# Patient Record
Sex: FEMALE | Race: ASIAN | NOT HISPANIC OR LATINO | URBAN - METROPOLITAN AREA
[De-identification: names, ages, dates, MRNs, and addresses within clinical notes are randomized per-mention and may not be internally consistent; named-entity substitution may affect disease eponyms.]

---

## 2017-11-01 ENCOUNTER — EMERGENCY (EMERGENCY)
Facility: HOSPITAL | Age: 37
LOS: 1 days | Discharge: HOME | End: 2017-11-01
Attending: EMERGENCY MEDICINE | Admitting: EMERGENCY MEDICINE
Payer: COMMERCIAL

## 2017-11-01 VITALS
OXYGEN SATURATION: 99 % | HEART RATE: 108 BPM | RESPIRATION RATE: 15 BRPM | TEMPERATURE: 98 F | SYSTOLIC BLOOD PRESSURE: 142 MMHG | DIASTOLIC BLOOD PRESSURE: 92 MMHG

## 2017-11-01 PROCEDURE — 99284 EMERGENCY DEPT VISIT MOD MDM: CPT | Mod: 25

## 2017-11-01 PROCEDURE — 99284 EMERGENCY DEPT VISIT MOD MDM: CPT | Mod: GC

## 2017-11-01 RX ORDER — DEXAMETHASONE 0.5 MG/5ML
10 ELIXIR ORAL ONCE
Qty: 0 | Refills: 0 | Status: COMPLETED | OUTPATIENT
Start: 2017-11-01 | End: 2017-11-01

## 2017-11-01 RX ORDER — DIPHENHYDRAMINE HCL 50 MG
25 CAPSULE ORAL ONCE
Qty: 0 | Refills: 0 | Status: COMPLETED | OUTPATIENT
Start: 2017-11-01 | End: 2017-11-01

## 2017-11-01 RX ORDER — FAMOTIDINE 10 MG/ML
20 INJECTION INTRAVENOUS ONCE
Qty: 0 | Refills: 0 | Status: COMPLETED | OUTPATIENT
Start: 2017-11-01 | End: 2017-11-01

## 2017-11-01 RX ORDER — SODIUM CHLORIDE 9 MG/ML
1000 INJECTION INTRAMUSCULAR; INTRAVENOUS; SUBCUTANEOUS ONCE
Qty: 0 | Refills: 0 | Status: COMPLETED | OUTPATIENT
Start: 2017-11-01 | End: 2017-11-01

## 2017-11-01 RX ORDER — KETOROLAC TROMETHAMINE 30 MG/ML
30 SYRINGE (ML) INJECTION ONCE
Qty: 0 | Refills: 0 | Status: DISCONTINUED | OUTPATIENT
Start: 2017-11-01 | End: 2017-11-01

## 2017-11-01 NOTE — ED PROVIDER NOTE - NS_ ATTENDINGSCRIBEDETAILS _ED_A_ED_FT
Dr. Schultz:  I personally performed the services described in the documentation, reviewed the documentation recorded by the scribe in my presence and it accurately and completely records my words and action. The scribe's documentation has been prepared under my direction and personally reviewed by me in its entirety. I confirm that the note above accurately reflects all work, treatment, procedures, and medical decision making performed by me.

## 2017-11-01 NOTE — ED ADULT TRIAGE NOTE - CHIEF COMPLAINT QUOTE
Pt c/o generalized rash for the past two weeks.  Pt states was evaluated at start of rash at [facility], had blood work done and told everything was negative.  Pt states was told it may be bacterial.  Pt denies any itchiness/pain.  Pt just endorses swelling to legs and hands that are causing discomfort.  denies any PMHx.

## 2017-11-01 NOTE — ED PROVIDER NOTE - CONSTITUTIONAL, MLM
normal... Well appearing, well nourished, awake, alert, oriented to person, place, time/situation and in no apparent distress. nontoxic

## 2017-11-01 NOTE — ED PROVIDER NOTE - OBJECTIVE STATEMENT
38 y/o F pt with no sig PMHx, arrives to the ED c/o a worsening diffused rash and diffused swelling onset x2 weeks. Pt reports that she was at another hospital and was d/c home with no meds. because her blood tests came back nml. Pt has not been on steroids. Has not taken Benadryl. LNMP: two weeks ago. Also c/o body aches and difficulty bearing weight. Denies fever, urinary issues, night sweats, cough or any other complaints. NKDA.

## 2017-11-02 VITALS
RESPIRATION RATE: 18 BRPM | OXYGEN SATURATION: 100 % | SYSTOLIC BLOOD PRESSURE: 127 MMHG | DIASTOLIC BLOOD PRESSURE: 74 MMHG | HEART RATE: 104 BPM | TEMPERATURE: 98 F

## 2017-11-02 LAB
ALBUMIN SERPL ELPH-MCNC: 4.4 G/DL — SIGNIFICANT CHANGE UP (ref 3.3–5)
ALP SERPL-CCNC: 95 U/L — SIGNIFICANT CHANGE UP (ref 40–120)
ALT FLD-CCNC: 29 U/L — SIGNIFICANT CHANGE UP (ref 4–33)
APPEARANCE UR: CLEAR — SIGNIFICANT CHANGE UP
AST SERPL-CCNC: 30 U/L — SIGNIFICANT CHANGE UP (ref 4–32)
BASOPHILS # BLD AUTO: 0.02 K/UL — SIGNIFICANT CHANGE UP (ref 0–0.2)
BASOPHILS NFR BLD AUTO: 0.2 % — SIGNIFICANT CHANGE UP (ref 0–2)
BILIRUB SERPL-MCNC: 0.3 MG/DL — SIGNIFICANT CHANGE UP (ref 0.2–1.2)
BILIRUB UR-MCNC: NEGATIVE — SIGNIFICANT CHANGE UP
BLOOD UR QL VISUAL: HIGH
BUN SERPL-MCNC: 14 MG/DL — SIGNIFICANT CHANGE UP (ref 7–23)
CALCIUM SERPL-MCNC: 9.4 MG/DL — SIGNIFICANT CHANGE UP (ref 8.4–10.5)
CHLORIDE SERPL-SCNC: 102 MMOL/L — SIGNIFICANT CHANGE UP (ref 98–107)
CO2 SERPL-SCNC: 24 MMOL/L — SIGNIFICANT CHANGE UP (ref 22–31)
COLOR SPEC: SIGNIFICANT CHANGE UP
CREAT SERPL-MCNC: 0.75 MG/DL — SIGNIFICANT CHANGE UP (ref 0.5–1.3)
CRP SERPL-MCNC: 34.2 MG/L — HIGH
EOSINOPHIL # BLD AUTO: 0.12 K/UL — SIGNIFICANT CHANGE UP (ref 0–0.5)
EOSINOPHIL NFR BLD AUTO: 1.1 % — SIGNIFICANT CHANGE UP (ref 0–6)
ERYTHROCYTE [SEDIMENTATION RATE] IN BLOOD: 23 MM/HR — SIGNIFICANT CHANGE UP (ref 4–25)
GLUCOSE SERPL-MCNC: 115 MG/DL — HIGH (ref 70–99)
GLUCOSE UR-MCNC: NEGATIVE — SIGNIFICANT CHANGE UP
HBA1C BLD-MCNC: 6 % — HIGH (ref 4–5.6)
HCT VFR BLD CALC: 40.5 % — SIGNIFICANT CHANGE UP (ref 34.5–45)
HGB BLD-MCNC: 13.2 G/DL — SIGNIFICANT CHANGE UP (ref 11.5–15.5)
IMM GRANULOCYTES # BLD AUTO: 0.02 # — SIGNIFICANT CHANGE UP
IMM GRANULOCYTES NFR BLD AUTO: 0.2 % — SIGNIFICANT CHANGE UP (ref 0–1.5)
KETONES UR-MCNC: NEGATIVE — SIGNIFICANT CHANGE UP
LEUKOCYTE ESTERASE UR-ACNC: NEGATIVE — SIGNIFICANT CHANGE UP
LYMPHOCYTES # BLD AUTO: 2.24 K/UL — SIGNIFICANT CHANGE UP (ref 1–3.3)
LYMPHOCYTES # BLD AUTO: 21 % — SIGNIFICANT CHANGE UP (ref 13–44)
MCHC RBC-ENTMCNC: 27.6 PG — SIGNIFICANT CHANGE UP (ref 27–34)
MCHC RBC-ENTMCNC: 32.6 % — SIGNIFICANT CHANGE UP (ref 32–36)
MCV RBC AUTO: 84.7 FL — SIGNIFICANT CHANGE UP (ref 80–100)
MONOCYTES # BLD AUTO: 0.6 K/UL — SIGNIFICANT CHANGE UP (ref 0–0.9)
MONOCYTES NFR BLD AUTO: 5.6 % — SIGNIFICANT CHANGE UP (ref 2–14)
NEUTROPHILS # BLD AUTO: 7.66 K/UL — HIGH (ref 1.8–7.4)
NEUTROPHILS NFR BLD AUTO: 71.9 % — SIGNIFICANT CHANGE UP (ref 43–77)
NITRITE UR-MCNC: NEGATIVE — SIGNIFICANT CHANGE UP
NRBC # FLD: 0 — SIGNIFICANT CHANGE UP
PH UR: 6 — SIGNIFICANT CHANGE UP (ref 4.6–8)
PLATELET # BLD AUTO: 258 K/UL — SIGNIFICANT CHANGE UP (ref 150–400)
PMV BLD: 9.5 FL — SIGNIFICANT CHANGE UP (ref 7–13)
POTASSIUM SERPL-MCNC: 3.5 MMOL/L — SIGNIFICANT CHANGE UP (ref 3.5–5.3)
POTASSIUM SERPL-SCNC: 3.5 MMOL/L — SIGNIFICANT CHANGE UP (ref 3.5–5.3)
PROT SERPL-MCNC: 8.1 G/DL — SIGNIFICANT CHANGE UP (ref 6–8.3)
PROT UR-MCNC: NEGATIVE — SIGNIFICANT CHANGE UP
RBC # BLD: 4.78 M/UL — SIGNIFICANT CHANGE UP (ref 3.8–5.2)
RBC # FLD: 12.9 % — SIGNIFICANT CHANGE UP (ref 10.3–14.5)
RBC CASTS # UR COMP ASSIST: SIGNIFICANT CHANGE UP (ref 0–?)
SODIUM SERPL-SCNC: 141 MMOL/L — SIGNIFICANT CHANGE UP (ref 135–145)
SP GR SPEC: 1.01 — SIGNIFICANT CHANGE UP (ref 1–1.03)
SQUAMOUS # UR AUTO: SIGNIFICANT CHANGE UP
UROBILINOGEN FLD QL: NORMAL E.U. — SIGNIFICANT CHANGE UP (ref 0.1–0.2)
WBC # BLD: 10.66 K/UL — HIGH (ref 3.8–10.5)
WBC # FLD AUTO: 10.66 K/UL — HIGH (ref 3.8–10.5)
WBC UR QL: SIGNIFICANT CHANGE UP (ref 0–?)

## 2017-11-02 PROCEDURE — 99235 HOSP IP/OBS SAME DATE MOD 70: CPT

## 2017-11-02 RX ORDER — SODIUM CHLORIDE 9 MG/ML
1000 INJECTION INTRAMUSCULAR; INTRAVENOUS; SUBCUTANEOUS
Qty: 0 | Refills: 0 | Status: DISCONTINUED | OUTPATIENT
Start: 2017-11-02 | End: 2017-11-11

## 2017-11-02 RX ORDER — DIPHENHYDRAMINE HCL 50 MG
25 CAPSULE ORAL EVERY 6 HOURS
Qty: 0 | Refills: 0 | Status: DISCONTINUED | OUTPATIENT
Start: 2017-11-02 | End: 2017-11-11

## 2017-11-02 RX ORDER — COLCHICINE 0.6 MG
1 TABLET ORAL
Qty: 27 | Refills: 0 | OUTPATIENT
Start: 2017-11-02 | End: 2017-11-16

## 2017-11-02 RX ORDER — FAMOTIDINE 10 MG/ML
20 INJECTION INTRAVENOUS EVERY 12 HOURS
Qty: 0 | Refills: 0 | Status: DISCONTINUED | OUTPATIENT
Start: 2017-11-02 | End: 2017-11-11

## 2017-11-02 RX ORDER — COLCHICINE 0.6 MG
0.6 TABLET ORAL ONCE
Qty: 0 | Refills: 0 | Status: COMPLETED | OUTPATIENT
Start: 2017-11-02 | End: 2017-11-02

## 2017-11-02 RX ADMIN — FAMOTIDINE 20 MILLIGRAM(S): 10 INJECTION INTRAVENOUS at 00:32

## 2017-11-02 RX ADMIN — Medication 30 MILLIGRAM(S): at 00:30

## 2017-11-02 RX ADMIN — Medication 25 MILLIGRAM(S): at 06:04

## 2017-11-02 RX ADMIN — Medication 30 MILLIGRAM(S): at 00:45

## 2017-11-02 RX ADMIN — Medication 25 MILLIGRAM(S): at 12:40

## 2017-11-02 RX ADMIN — Medication 0.6 MILLIGRAM(S): at 13:42

## 2017-11-02 RX ADMIN — SODIUM CHLORIDE 2000 MILLILITER(S): 9 INJECTION INTRAMUSCULAR; INTRAVENOUS; SUBCUTANEOUS at 00:13

## 2017-11-02 RX ADMIN — Medication 25 MILLIGRAM(S): at 00:35

## 2017-11-02 RX ADMIN — FAMOTIDINE 20 MILLIGRAM(S): 10 INJECTION INTRAVENOUS at 12:40

## 2017-11-02 RX ADMIN — Medication 102 MILLIGRAM(S): at 00:40

## 2017-11-02 RX ADMIN — SODIUM CHLORIDE 150 MILLILITER(S): 9 INJECTION INTRAMUSCULAR; INTRAVENOUS; SUBCUTANEOUS at 03:45

## 2017-11-02 NOTE — ED CDU PROVIDER INITIAL DAY NOTE - MUSCULOSKELETAL [+], MLM
body aches and difficulty bearing weight due to lower extremity swelling associated with rash and discomfort due to this.

## 2017-11-02 NOTE — ED ADULT NURSE NOTE - CHIEF COMPLAINT QUOTE
Pt c/o generalized rash for the past two weeks.  Pt states was evaluated at start of rash at [facility], had blood work done and told everything was negative.  Pt states was told it may be bacterial.  Pt denies any itchiness/pain.  Pt just endorses swelling to legs and hands that are causing discomfort.  denies any PMHx.

## 2017-11-02 NOTE — ED CDU PROVIDER DISPOSITION NOTE - PLAN OF CARE
See your primary care doctor within 24-48 hours. Follow up at the rheumatology clinic on November 15th at 9am for further evaluation, [address] [place], [phone], bring copies of all reports with you. May also follow up with the dermatology clinic, call [phone] to make an appointment. START Colchicine 0.6mg twice a day with food for 2 weeks. Return to the ER for worsening symptoms or any other concerns.

## 2017-11-02 NOTE — ED CDU PROVIDER INITIAL DAY NOTE - MUSCULOSKELETAL, MLM
Range of motion is not limited, no muscle or joint tenderness objectively noted on exam.  No calf TTP bilaterally.

## 2017-11-02 NOTE — ED CDU PROVIDER INITIAL DAY NOTE - SKIN RASH DESCRIPTION
Discrete areas of macular nonblanchable erythema, most noted to LE>UE; rash is symmetrical, lesions are NTTP; lesions are discrete, but dermatitis is generalized to extremities.  Less numerous are similar appearing lesions to abdomen and torso.  No subungual lesions noted.  No mottling/cyanosis/pallor.

## 2017-11-02 NOTE — ED CDU PROVIDER INITIAL DAY NOTE - OBJECTIVE STATEMENT
38 y/o F pt with no sig PMHx, arrives to the ED c/o a worsening diffused rash and diffused swelling onset x2 weeks. Pt reports that she was at another hospital and was d/c home with no meds. because her blood tests came back nml. Pt has not been on steroids. Has not taken Benadryl. LNMP: two weeks ago. Also c/o body aches and difficulty bearing weight. Denies fever, urinary issues, night sweats, cough or any other complaints. NKDA.    CDU BLANE Chau Note-----  ED Provider Note reviewed per above; pt is a 38 yo female with no stated PMH, who presented to the ED for rash x 2 - 3 weeks as noted above.  Pt. was evaluated in the ED and diagnosed with rash that appears vasculitis in nature.  Pt was treated with Benadryl, Pepcid, Decadron, and Toradol (pt reports feeling improved) and sent to CDU for continuation of meds per orders, Derm and Rheum consult 11/2 daytime, observation and reassessment.  On CDU evaluation, pt has no active c/o; states feeling improved with medications.  Pt. denies pain at present; states was having discomfort to body (extremities) due to swelling from rash.  No hx/o fevers/chills; pt denies URI symptoms, chest discomfort, SOB, abdominal pain, N/V, recent illness, recent medication use.  Pt. is not on any home medications.  CDU plan discussed with pt who verbalizes agreement with plan.

## 2017-11-02 NOTE — ED CDU PROVIDER INITIAL DAY NOTE - PROGRESS NOTE DETAILS
CDU PA Isac Addendum----  Pt. resting comfortably in interim; no issues to date; will continue to monitor / reassess.  Pt. will be signed out to CDU day PA and attending at 0700 hrs. 36 y/o F no PMHx here c/o rash and subcutaneous edema, generalized, worse over the lower extremities x 2 weeks. In the main ED pt was given Decadron, Benadryl w/ some relief, however rash persists. Denies recent travel/viral illnesses/new meds. 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 Recommend follow up in 2 weeks. Pt feeling well otherwise.

## 2017-11-02 NOTE — ED CDU PROVIDER SUBSEQUENT DAY NOTE - HISTORY
38 y/o F no PMHx here c/o rash and subcutaneous edema, generalized, worse over the lower extremities x 2 weeks. In the main ED pt was given Decadron, Benadryl w/ some relief, however rash persists. Denies recent travel/viral illnesses/new meds. 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 Recommend follow up in 2 weeks. Pt feeling well otherwise.

## 2017-11-02 NOTE — ED CDU PROVIDER INITIAL DAY NOTE - ATTENDING CONTRIBUTION TO CARE
Case of a 38 y/o female patient with no past medical history of systemic illness that presented to the ED for rash. Patient wit macular/brusing rash tender with the appearance of vasculitis. Patient sent in to CDU for rheumatology and derm follow up. Patient not complaining of pain. Will monitor closely. Agree with resident's physical exam, assessment and documentation

## 2017-11-02 NOTE — ED CDU PROVIDER DISPOSITION NOTE - CLINICAL COURSE
Case of a 36 y/o female patient with no past medical history of systemic illness that presented to the ED for rash. Patient wit macular/brusing rash tender with the appearance of vasculitis. Patient sent in to CDU for rheumatology and derm follow up. Patient not complaining of pain. Patient evaluated by Rheumatology and they understand that she has a small vessel vasculitis. They recommended biopsy by dermatology outpatient and colchicine. We'll discharge patient on colchicine BID, f/u with derm outpatient and with rheumatology in two weeks.

## 2017-11-02 NOTE — ED ADULT NURSE NOTE - OBJECTIVE STATEMENT
pt states she has had a rash for 3 weeks that is not going away and getting worse. Red raised rash on extremities, lower abdomen and lower back. denies itching. c.o. increased swelling in legs with difficulty walking. respirations even and  unlabored.  states she was initially  seen at another hospital and told blood work was normal.  sl placed labs sent.

## 2017-11-02 NOTE — ED ADULT NURSE REASSESSMENT NOTE - NS ED NURSE REASSESS COMMENT FT1
No acute distress at present. Respirations are even and unlabored on room air. VS as noted. Pt. is resting comfortably. Family at bedside. Will continue to monitor.

## 2017-11-02 NOTE — CONSULT NOTE ADULT - ASSESSMENT
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  Pt with no other s/s to suggest ANCA associated vasculitis cryoglobulinemic vasculitis or hypersensitivity (urticarial) vasculitis  Generally, IgAV is self limited and does not require therapy; however with severe cutaneous involvement colchicine has been helpful  (https://www.ncbi.nlm.nih.gov/pmc/articles/AZI3767158/)      Plan:  Obtain skin bx - to look for leukocytoclastic vasculitis and IgA deposition  and immunofluorescent studies  check serum IgA level  Colchicine 0.6mg BID 36 yo F with new acute onset of diffuse purpuric rash, initially starting in LE and moving cephalad over the course of the last three weeks wo any inciting factors  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  Pt with no other s/s to suggest ANCA associated vasculitis cryoglobulinemic vasculitis or hypersensitivity (urticarial) vasculitis  Generally, IgAV is self limited and does not require therapy; however with severe cutaneous involvement colchicine has been helpful  (https://www.ncbi.nlm.nih.gov/pmc/articles/GGE5843075/)      Plan:  Obtain skin bx - to look for leukocytoclastic vasculitis (IgA deposition) and immunofluorescent studies  check serum IgA level  Colchicine 0.6mg BID  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  Pt with no other s/s to suggest ANCA associated vasculitis cryoglobulinemic vasculitis or hypersensitivity (urticarial) vasculitis  Generally, IgAV is self limited and does not require therapy; however with severe cutaneous involvement colchicine has been helpful  (https://www.ncbi.nlm.nih.gov/pmc/articles/ADU1192247/)      Plan:  Obtain skin bx - to look for leukocytoclastic vasculitis (IgA deposition) and immunofluorescent studies  check serum IgA level and complement level (c3, C4)  Colchicine 0.6mg BID  Pt [facility]   [phone]  11/16/17 9am 36 yo F with new acute onset of diffuse purpuric rash, initially starting in LE and moving cephalad over the course of the last three weeks wo any inciting factors   Clinically the appearance of rash is most consistent with small vessel vasculitis ,  limited to cutaneous involvement at this time = LCV.     Differential includes:  IgA vasculitis =HSP / vs  hypersensitivity vasculitis/    LCV associated with ANCA associated vasculitis / vs cryoglobulinemic vasculitis      HSP seem to be the most likely dx and although mostly seen in children  , it has been described in young adults . In adults it is more likely to have more sever course . It usually is self limited and does not require therapy, lasts about  over 4-6 weeks with occasionally with recurrence during that period   In severe cutaneous involvement - Colchicine has been helpful  (https://www.ncbi.nlm.nih.gov/pmc/articles/JER1748974/)      Plan:  Obtain skin bx  with  slides for IF stains to look for IgA deposition  check serum IgA level and complement level (c3, C4), cryo, ASO,   Colchicine 0.6mg BID  Pt to follow up with rheumatologist Dr. Silvestre  at [address] [facility], [place]   [phone]  11/16/17 9am

## 2017-11-02 NOTE — CONSULT NOTE ADULT - SUBJECTIVE AND OBJECTIVE BOX
TORI ARREDONDO  [id number]    HISTORY OF PRESENT ILLNESS:    36 yo F with no significant past medical history here with acute rash for which rheumatology is consulted for.  Pt states the rash suddenly began 3 weeks ago which were primarily in the legs initially.  Then after few days of stablility and prgression towards resolution, she dveloped a more diffuse rash which again started in legs and moved up to include her belly, buttocks and arms.   States the rash is red, slightly raised with some areas of tenderness and other areas not.  States it is not itchy  Pt denies any new medications, recent travels, sick exposure or herself being sick with any illiness including URI or UTI.  Denies any fever, HA, vision changes, sinus disease, chest pain, SOB, cough, hemoptysis abd pain, dysuria, hematuria, diarrhea or blood with BM.  Denies any history of joint pain, am stiffness or swelling - only yesterday had a period of knee pain which has since resolved  Denies any hx of asthma, allergies, kidney disease or pulmonary disease.    She is  with no complications in her pregnancy  No hx of any DVT/PE or coagulapathy    PAST MEDICAL & SURGICAL HISTORY:  No pertinent past medical history  No significant past surgical history      Review of Systems:  Gen:  No fevers/chills, weight loss  HEENT: No blurry vision, no difficulty swallowing  CVS: No chest pain/palpitations  Resp: No SOB/wheezing  GI: No N/V/C/D/abdominal pain  MSK:  Skin: No new rashes  Neuro: No headaches    MEDICATIONS  (STANDING):  colchicine 0.6 milliGRAM(s) Oral once  diphenhydrAMINE   Injectable 25 milliGRAM(s) IV Push every 6 hours  famotidine Injectable 20 milliGRAM(s) IV Push every 12 hours  sodium chloride 0.9%. 1000 milliLiter(s) (150 mL/Hr) IV Continuous <Continuous>    MEDICATIONS  (PRN):      Allergies    No Known Allergies    Intolerances        PERTINENT MEDICATION HISTORY:    SOCIAL HISTORY:  OCCUPATION:  TRAVEL HISTORY:    FAMILY HISTORY:  No pertinent family history in first degree relatives      Vital Signs Last 24 Hrs  T(C): 36.7 (2017 10:05), Max: 36.9 (2017 02:30)  T(F): 98.1 (2017 10:05), Max: 98.4 (2017 02:30)  HR: 104 (2017 10:05) (72 - 108)  BP: 127/74 (2017 10:05) (110/75 - 142/92)  BP(mean): --  RR: 18 (2017 10:05) (15 - 18)  SpO2: 100% (2017 10:05) (99% - 100%)    Daily     Daily     Physical Exam:  General: No apparent distress  HEENT: EOMI, MMM  CVS: +S1/S2, RRR, no murmurs/rubs/gallops  Resp: CTA b/l. No crackles/wheezing  GI: Soft, NT/ND +BS  MSK:  Shoulders: wnl  Elbows: wnl  Wrists: wnl  MCPs: wnl  PIPs: wnl  DIPs: wnl   Hips: wnl  Knees: wnl   Ankle: wnl  Neuro: AAOx3  Skin: no visible rashes    LABS:                        13.2   10.66 )-----------( 258      ( 2017 01:01 )             40.5     11-02    141  |  102  |  14  ----------------------------<  115<H>  3.5   |  24  |  0.75    Ca    9.4      2017 01:01    TPro  8.1  /  Alb  4.4  /  TBili  0.3  /  DBili  x   /  AST  30  /  ALT  29  /  AlkPhos  95  11-02      Urinalysis Basic - ( 2017 00:10 )    Color: LT. YELLOW / Appearance: CLEAR / S.006 / pH: 6.0  Gluc: NEGATIVE / Ketone: NEGATIVE  / Bili: NEGATIVE / Urobili: NORMAL E.U.   Blood: SMALL / Protein: NEGATIVE / Nitrite: NEGATIVE   Leuk Esterase: NEGATIVE / RBC: 0-2 / WBC 0-2   Sq Epi: OCC / Non Sq Epi: x / Bacteria: x        RADIOLOGY & ADDITIONAL STUDIES: TORI ARREDONDO  [id number]    HISTORY OF PRESENT ILLNESS:    38 yo F with no significant past medical history here with acute rash for which rheumatology is consulted for.  Pt states the rash suddenly began 3 weeks ago which were primarily in the legs initially.  Then after few days of stablility and prgression towards resolution, she dveloped a more diffuse rash which again started in legs and moved up to include her belly, buttocks and arms.   States the rash is red, slightly raised with some areas of tenderness and other areas not.  States it is not itchy and there is no areas of ulceration or drainage.  Pt denies any new medications, recent travels, sick exposure or herself being sick with any illiness including URI or UTI.  Denies any fever, HA, vision changes, sinus disease, chest pain, SOB, cough, hemoptysis abd pain, dysuria, hematuria, diarrhea or blood with BM.  Denies any history of joint pain, am stiffness or swelling - only yesterday had a period of knee pain which has since resolved  Denies any hx of asthma, allergies, kidney disease or pulmonary disease.    She is  with no complications in her pregnancy  No hx of any DVT/PE or coagulapathy    PAST MEDICAL & SURGICAL HISTORY:  No pertinent past medical history  No significant past surgical history      Review of Systems:  Gen:  No fevers/chills, weight loss  HEENT: No blurry vision, no difficulty swallowing  CVS: No chest pain/palpitations  Resp: No SOB/wheezing  GI: No N/V/C/D/abdominal pain  MSK: no joint pain or swelling  Skin: red, slightly raised with some areas of tenderness and other areas not - located in   on both legs, belly, back and arms  Neuro: No headaches, no loss of sensation or weakness    MEDICATIONS  (STANDING):  colchicine 0.6 milliGRAM(s) Oral once  diphenhydrAMINE   Injectable 25 milliGRAM(s) IV Push every 6 hours  famotidine Injectable 20 milliGRAM(s) IV Push every 12 hours  sodium chloride 0.9%. 1000 milliLiter(s) (150 mL/Hr) IV Continuous <Continuous>    MEDICATIONS  (PRN):      Allergies    No Known Allergies    Intolerances        PERTINENT MEDICATION HISTORY:    SOCIAL HISTORY: None  OCCUPATION: [employer]  TRAVEL HISTORY: none    FAMILY HISTORY:  No pertinent family history in first degree relatives      Vital Signs Last 24 Hrs  T(C): 36.7 (2017 10:05), Max: 36.9 (2017 02:30)  T(F): 98.1 (2017 10:05), Max: 98.4 (2017 02:30)  HR: 104 (2017 10:05) (72 - 108)  BP: 127/74 (2017 10:05) (110/75 - 142/92)  BP(mean): --  RR: 18 (2017 10:05) (15 - 18)  SpO2: 100% (2017 10:05) (99% - 100%)    Daily     Daily     Physical Exam:  General: No apparent distress  HEENT: EOMI, MMM  CVS: +S1/S2, RRR, no murmurs/rubs/gallops  Resp: CTA b/l. No crackles/wheezing  GI: Soft, NT/ND +BS  MSK: no synovitis, ROM and MS intact in UE and LE b/l  Neuro: AAOx3  Skin: diffuse palpable purpuric rash in LE, abd, buttocks and UE b/l; non blanching    LABS:                        13.2   10.66 )-----------( 258      ( 2017 01:01 )             40.5     11-02    141  |  102  |  14  ----------------------------<  115<H>  3.5   |  24  |  0.75    Ca    9.4      2017 01:01    TPro  8.1  /  Alb  4.4  /  TBili  0.3  /  DBili  x   /  AST  30  /  ALT  29  /  AlkPhos  95  11-02      Urinalysis Basic - ( 2017 00:10 )    Color: LT. YELLOW / Appearance: CLEAR / S.006 / pH: 6.0  Gluc: NEGATIVE / Ketone: NEGATIVE  / Bili: NEGATIVE / Urobili: NORMAL E.U.   Blood: SMALL / Protein: NEGATIVE / Nitrite: NEGATIVE   Leuk Esterase: NEGATIVE / RBC: 0-2 / WBC 0-2   Sq Epi: OCC / Non Sq Epi: x / Bacteria: x        RADIOLOGY & ADDITIONAL STUDIES: TORI ARREDONDO  [id number]    HISTORY OF PRESENT ILLNESS:    rheumatology consult is called to r/o vasculitis    36 yo F with no significant past medical history  came to ER with acute rash  .  Pt states the rash suddenly began 3 weeks ago which were primarily in the legs initially and improved spontaneously without  any intervention.  Few days ago ther rash retuned and spread extensively from legs and  up to buttocks, lower back, abdominal wall and arms.   States the rash is  somewhat tender, not pruritic , no areas of ulcerations.  Pt denies any new medications, recent travels, sick exposure or herself being sick with any illiness including URI or UTI.  Denies any fever, HA, vision changes, sinus disease, chest pain, SOB, cough, hemoptysis abd pain, dysuria, hematuria, diarrhea or blood with BM.  Denies any history of joint pain, am stiffness or swelling - only yesterday had a period of knee pain which has since resolved  Denies any hx of asthma, allergies, kidney disease or pulmonary disease.    She is  with no complications in her pregnancy  No hx of any DVT/PE or coagulapathy    PAST MEDICAL & SURGICAL HISTORY:  No pertinent past medical history  No significant past surgical history      Review of Systems:  Gen:  No fevers/chills, weight loss  HEENT: No blurry vision, no difficulty swallowing  CVS: No chest pain/palpitations  Resp: No SOB/wheezing  GI: No N/V/C/D/abdominal pain  MSK: no joint pain or swelling  Skin: red, slightly raised with some areas of tenderness and other areas not - located in   on both legs, belly, back and arms  Neuro: No headaches, no loss of sensation or weakness    MEDICATIONS  (STANDING):  colchicine 0.6 milliGRAM(s) Oral once  diphenhydrAMINE   Injectable 25 milliGRAM(s) IV Push every 6 hours  famotidine Injectable 20 milliGRAM(s) IV Push every 12 hours  sodium chloride 0.9%. 1000 milliLiter(s) (150 mL/Hr) IV Continuous <Continuous>    MEDICATIONS  (PRN):      Allergies    No Known Allergies    Intolerances        PERTINENT MEDICATION HISTORY:    SOCIAL HISTORY: None  OCCUPATION: [employer]  TRAVEL HISTORY: none    FAMILY HISTORY:  No pertinent family history in first degree relatives      Vital Signs Last 24 Hrs  T(C): 36.7 (2017 10:05), Max: 36.9 (2017 02:30)  T(F): 98.1 (2017 10:05), Max: 98.4 (2017 02:30)  HR: 104 (2017 10:05) (72 - 108)  BP: 127/74 (2017 10:05) (110/75 - 142/92)  BP(mean): --  RR: 18 (2017 10:05) (15 - 18)  SpO2: 100% (2017 10:05) (99% - 100%)    Daily     Daily     Physical Exam:  General: No apparent distress  HEENT: EOMI, MMM  CVS: +S1/S2, RRR, no murmurs/rubs/gallops  Resp: CTA b/l. No crackles/wheezing  GI: Soft, NT/ND +BS  MSK: no synovitis, ROM and MS intact in UE and LE b/l  Neuro: AAOx3  Skin: diffuse palpable nonblanching purpuric rash in LE, abd, buttocks and UE b/l    LABS:                        13.2   10.66 )-----------( 258      ( 2017 01:01 )             40.5     11-02    141  |  102  |  14  ----------------------------<  115<H>  3.5   |  24  |  0.75    Ca    9.4      2017 01:01    TPro  8.1  /  Alb  4.4  /  TBili  0.3  /  DBili  x   /  AST  30  /  ALT  29  /  AlkPhos  95  11-02      Urinalysis Basic - ( 2017 00:10 )    Color: LT. YELLOW / Appearance: CLEAR / S.006 / pH: 6.0  Gluc: NEGATIVE / Ketone: NEGATIVE  / Bili: NEGATIVE / Urobili: NORMAL E.U.   Blood: SMALL / Protein: NEGATIVE / Nitrite: NEGATIVE   Leuk Esterase: NEGATIVE / RBC: 0-2 / WBC 0-2   Sq Epi: OCC / Non Sq Epi: x / Bacteria: x        RADIOLOGY & ADDITIONAL STUDIES:

## 2017-11-02 NOTE — ED CDU PROVIDER INITIAL DAY NOTE - ENMT NEGATIVE STATEMENT, MLM
Ears: no ear pain and no hearing problems. Nose: no nasal congestion and no nasal drainage. Mouth/Throat: no dysphagia, no hoarseness and no throat pain.Neck: no lumps, no pain, no stiffness and no swollen glands

## 2017-11-02 NOTE — ED CDU PROVIDER SUBSEQUENT DAY NOTE - PHYSICAL EXAMINATION
Extensive raised maculopapular rash, non-blanchable, more heavily concentrated in the lower abdomen and lower extremities. No warmth to touch or underlying erythema

## 2017-11-03 LAB
C-ANCA SER-ACNC: NEGATIVE — SIGNIFICANT CHANGE UP
P-ANCA SER-ACNC: NEGATIVE — SIGNIFICANT CHANGE UP

## 2017-11-04 LAB
BACTERIA UR CULT: SIGNIFICANT CHANGE UP
SPECIMEN SOURCE: SIGNIFICANT CHANGE UP

## 2017-11-16 ENCOUNTER — EMERGENCY (EMERGENCY)
Facility: HOSPITAL | Age: 37
LOS: 1 days | Discharge: HOME | End: 2017-11-16
Attending: EMERGENCY MEDICINE | Admitting: EMERGENCY MEDICINE
Payer: COMMERCIAL

## 2017-11-16 VITALS
TEMPERATURE: 99 F | OXYGEN SATURATION: 100 % | RESPIRATION RATE: 14 BRPM | DIASTOLIC BLOOD PRESSURE: 84 MMHG | SYSTOLIC BLOOD PRESSURE: 129 MMHG | HEART RATE: 91 BPM

## 2017-11-16 VITALS
TEMPERATURE: 98 F | HEART RATE: 94 BPM | OXYGEN SATURATION: 100 % | RESPIRATION RATE: 16 BRPM | DIASTOLIC BLOOD PRESSURE: 95 MMHG | SYSTOLIC BLOOD PRESSURE: 144 MMHG

## 2017-11-16 DIAGNOSIS — Z98.891 HISTORY OF UTERINE SCAR FROM PREVIOUS SURGERY: Chronic | ICD-10-CM

## 2017-11-16 LAB
ALBUMIN SERPL ELPH-MCNC: 4.3 G/DL — SIGNIFICANT CHANGE UP (ref 3.3–5)
ALP SERPL-CCNC: 84 U/L — SIGNIFICANT CHANGE UP (ref 40–120)
ALT FLD-CCNC: 20 U/L — SIGNIFICANT CHANGE UP (ref 4–33)
APPEARANCE UR: CLEAR — SIGNIFICANT CHANGE UP
AST SERPL-CCNC: 16 U/L — SIGNIFICANT CHANGE UP (ref 4–32)
BASOPHILS # BLD AUTO: 0.03 K/UL — SIGNIFICANT CHANGE UP (ref 0–0.2)
BASOPHILS NFR BLD AUTO: 0.2 % — SIGNIFICANT CHANGE UP (ref 0–2)
BILIRUB SERPL-MCNC: 0.2 MG/DL — SIGNIFICANT CHANGE UP (ref 0.2–1.2)
BILIRUB UR-MCNC: NEGATIVE — SIGNIFICANT CHANGE UP
BLOOD UR QL VISUAL: HIGH
BUN SERPL-MCNC: 8 MG/DL — SIGNIFICANT CHANGE UP (ref 7–23)
CALCIUM SERPL-MCNC: 8.7 MG/DL — SIGNIFICANT CHANGE UP (ref 8.4–10.5)
CHLORIDE SERPL-SCNC: 102 MMOL/L — SIGNIFICANT CHANGE UP (ref 98–107)
CO2 SERPL-SCNC: 24 MMOL/L — SIGNIFICANT CHANGE UP (ref 22–31)
COLOR SPEC: SIGNIFICANT CHANGE UP
CREAT SERPL-MCNC: 0.64 MG/DL — SIGNIFICANT CHANGE UP (ref 0.5–1.3)
EOSINOPHIL # BLD AUTO: 0.13 K/UL — SIGNIFICANT CHANGE UP (ref 0–0.5)
EOSINOPHIL NFR BLD AUTO: 0.9 % — SIGNIFICANT CHANGE UP (ref 0–6)
GLUCOSE SERPL-MCNC: 107 MG/DL — HIGH (ref 70–99)
GLUCOSE UR-MCNC: NEGATIVE — SIGNIFICANT CHANGE UP
HCT VFR BLD CALC: 36.7 % — SIGNIFICANT CHANGE UP (ref 34.5–45)
HGB BLD-MCNC: 11.8 G/DL — SIGNIFICANT CHANGE UP (ref 11.5–15.5)
IMM GRANULOCYTES # BLD AUTO: 0.06 # — SIGNIFICANT CHANGE UP
IMM GRANULOCYTES NFR BLD AUTO: 0.4 % — SIGNIFICANT CHANGE UP (ref 0–1.5)
KETONES UR-MCNC: NEGATIVE — SIGNIFICANT CHANGE UP
LEUKOCYTE ESTERASE UR-ACNC: NEGATIVE — SIGNIFICANT CHANGE UP
LIDOCAIN IGE QN: 30.3 U/L — SIGNIFICANT CHANGE UP (ref 7–60)
LYMPHOCYTES # BLD AUTO: 1.69 K/UL — SIGNIFICANT CHANGE UP (ref 1–3.3)
LYMPHOCYTES # BLD AUTO: 12 % — LOW (ref 13–44)
MCHC RBC-ENTMCNC: 27 PG — SIGNIFICANT CHANGE UP (ref 27–34)
MCHC RBC-ENTMCNC: 32.2 % — SIGNIFICANT CHANGE UP (ref 32–36)
MCV RBC AUTO: 84 FL — SIGNIFICANT CHANGE UP (ref 80–100)
MONOCYTES # BLD AUTO: 0.56 K/UL — SIGNIFICANT CHANGE UP (ref 0–0.9)
MONOCYTES NFR BLD AUTO: 4 % — SIGNIFICANT CHANGE UP (ref 2–14)
NEUTROPHILS # BLD AUTO: 11.64 K/UL — HIGH (ref 1.8–7.4)
NEUTROPHILS NFR BLD AUTO: 82.5 % — HIGH (ref 43–77)
NITRITE UR-MCNC: NEGATIVE — SIGNIFICANT CHANGE UP
NRBC # FLD: 0 — SIGNIFICANT CHANGE UP
PH UR: 6 — SIGNIFICANT CHANGE UP (ref 4.6–8)
PLATELET # BLD AUTO: 280 K/UL — SIGNIFICANT CHANGE UP (ref 150–400)
PMV BLD: 9.4 FL — SIGNIFICANT CHANGE UP (ref 7–13)
POTASSIUM SERPL-MCNC: 4 MMOL/L — SIGNIFICANT CHANGE UP (ref 3.5–5.3)
POTASSIUM SERPL-SCNC: 4 MMOL/L — SIGNIFICANT CHANGE UP (ref 3.5–5.3)
PROT SERPL-MCNC: 7.9 G/DL — SIGNIFICANT CHANGE UP (ref 6–8.3)
PROT UR-MCNC: 20 — SIGNIFICANT CHANGE UP
RBC # BLD: 4.37 M/UL — SIGNIFICANT CHANGE UP (ref 3.8–5.2)
RBC # FLD: 12.9 % — SIGNIFICANT CHANGE UP (ref 10.3–14.5)
RBC CASTS # UR COMP ASSIST: SIGNIFICANT CHANGE UP (ref 0–?)
SODIUM SERPL-SCNC: 139 MMOL/L — SIGNIFICANT CHANGE UP (ref 135–145)
SP GR SPEC: 1.01 — SIGNIFICANT CHANGE UP (ref 1–1.03)
SQUAMOUS # UR AUTO: SIGNIFICANT CHANGE UP
UROBILINOGEN FLD QL: NORMAL E.U. — SIGNIFICANT CHANGE UP (ref 0.1–0.2)
WBC # BLD: 14.11 K/UL — HIGH (ref 3.8–10.5)
WBC # FLD AUTO: 14.11 K/UL — HIGH (ref 3.8–10.5)

## 2017-11-16 PROCEDURE — 76705 ECHO EXAM OF ABDOMEN: CPT | Mod: 26

## 2017-11-16 PROCEDURE — 99284 EMERGENCY DEPT VISIT MOD MDM: CPT

## 2017-11-16 RX ORDER — ONDANSETRON 8 MG/1
4 TABLET, FILM COATED ORAL ONCE
Qty: 0 | Refills: 0 | Status: COMPLETED | OUTPATIENT
Start: 2017-11-16 | End: 2017-11-16

## 2017-11-16 RX ORDER — FAMOTIDINE 10 MG/ML
1 INJECTION INTRAVENOUS
Qty: 14 | Refills: 0 | OUTPATIENT
Start: 2017-11-16 | End: 2017-11-23

## 2017-11-16 RX ORDER — FAMOTIDINE 10 MG/ML
20 INJECTION INTRAVENOUS ONCE
Qty: 0 | Refills: 0 | Status: COMPLETED | OUTPATIENT
Start: 2017-11-16 | End: 2017-11-16

## 2017-11-16 RX ORDER — MORPHINE SULFATE 50 MG/1
4 CAPSULE, EXTENDED RELEASE ORAL ONCE
Qty: 0 | Refills: 0 | Status: DISCONTINUED | OUTPATIENT
Start: 2017-11-16 | End: 2017-11-16

## 2017-11-16 RX ORDER — SODIUM CHLORIDE 9 MG/ML
1000 INJECTION INTRAMUSCULAR; INTRAVENOUS; SUBCUTANEOUS ONCE
Qty: 0 | Refills: 0 | Status: COMPLETED | OUTPATIENT
Start: 2017-11-16 | End: 2017-11-16

## 2017-11-16 RX ORDER — SUCRALFATE 1 G
1 TABLET ORAL ONCE
Qty: 0 | Refills: 0 | Status: COMPLETED | OUTPATIENT
Start: 2017-11-16 | End: 2017-11-16

## 2017-11-16 RX ORDER — ONDANSETRON 8 MG/1
1 TABLET, FILM COATED ORAL
Qty: 9 | Refills: 0 | OUTPATIENT
Start: 2017-11-16 | End: 2017-11-19

## 2017-11-16 RX ADMIN — Medication 1 GRAM(S): at 12:34

## 2017-11-16 RX ADMIN — Medication 30 MILLILITER(S): at 09:40

## 2017-11-16 RX ADMIN — FAMOTIDINE 20 MILLIGRAM(S): 10 INJECTION INTRAVENOUS at 09:39

## 2017-11-16 RX ADMIN — SODIUM CHLORIDE 1000 MILLILITER(S): 9 INJECTION INTRAMUSCULAR; INTRAVENOUS; SUBCUTANEOUS at 09:40

## 2017-11-16 RX ADMIN — ONDANSETRON 4 MILLIGRAM(S): 8 TABLET, FILM COATED ORAL at 09:38

## 2017-11-16 NOTE — ED PROVIDER NOTE - CARE PLAN
Principal Discharge DX:	Epigastric pain  Secondary Diagnosis:	Non-intractable vomiting, presence of nausea not specified, unspecified vomiting type

## 2017-11-16 NOTE — ED ADULT NURSE NOTE - OBJECTIVE STATEMENT
Pt received to intake room 8 with reports of 10/10 epigastric pain since 1 am this morning with N/V and diarrhea. Pt received awake, alert, oriented x4, denies lightheadedness, dizziness at this time. Pt with respirations appearing even, unlabored. Pt ambulatory at baseline. 20g PIV placed in R AC, labs drawn and sent per orders, pt medicated per orders. Safety maintained, will continue to monitor.

## 2017-11-16 NOTE — ED PROVIDER NOTE - PROGRESS NOTE DETAILS
symptoms improved. All results d/w patient and copies given with instructions to bring with them to their follow up appointment.  The patient was given verbal and written discharge instructions Specifically, instructions when to return to the ED and to seek follow-up from their pcp within 1-2 days. Any specialty follow-up was discussed, including how to make an appointment.  Instructions were discussed in simple, plain language and was understood by the patient. The patient understands that should their symptoms worsen or any new symptoms arise, they should return to the ED immediately for further evaluation.  Vss, NAD, tolerating po and ambulating steadily at discharge. symptoms improved, tolerating po. All results d/w patient and copies given with instructions to bring with them to their follow up appointment.  The patient was given verbal and written discharge instructions Specifically, instructions when to return to the ED and to seek follow-up from their pcp within 1-2 days. Any specialty follow-up was discussed, including how to make an appointment.  Instructions were discussed in simple, plain language and was understood by the patient. The patient understands that should their symptoms worsen or any new symptoms arise, they should return to the ED immediately for further evaluation.  Vss, NAD, ambulating steadily at discharge.

## 2017-11-16 NOTE — ED PROVIDER NOTE - MEDICAL DECISION MAKING DETAILS
38 y/o F w/ no significant PMHx, prsents to the ED w/ epigastric pain, nausea, vomiting and diarrhea. Abd non tender. Vital signs stable. Plan: Labs, UA, hydration, GI cocktail and reassess.

## 2017-11-16 NOTE — ED ADULT TRIAGE NOTE - CHIEF COMPLAINT QUOTE
c/o upper abdominal pain with N/V/D since this A.M., Menstruation started 11/13/17,  Denies any fever or PMH.

## 2017-11-16 NOTE — ED PROVIDER NOTE - OBJECTIVE STATEMENT
36 y/o F w/ no significant PMhx, presents to the ED c/o epigastric abd pain w/ associated nausea, vomiting (4 episodes) and diarrhea (2 episodes) since this morning. Pain woke pt up from sleep. Pt was seen in ED last week for rash, dx w/ allergic rxn and dc'd home w/ medication. Denies fever, dysuria, recent travel or any other complaints. LNMP: now. 36 y/o F w/ no significant PMhx, presents to the ED c/o epigastric abd pain, nonradiating, constant,  w/ associated nausea, vomiting (4 episodes) and diarrhea (2 episodes) since this morning. Pain woke pt up from sleep. Pt was seen in ED last week for rash, dx w/ allergic rxn and dc'd home w/ medication. Denies fever, dysuria, recent travel or any other complaints. LNMP: now. 38 y/o F w/ no significant PMhx, presents to the ED c/o epigastric abd pain, nonradiating, constant,  w/ associated nausea, vomiting (4 episodes) and diarrhea (2 episodes) since this morning. Pain woke pt up from sleep. Denies fever, ha, neck pain, cp, sob, dysuria, recent travel or any other complaints. LNMP: now.

## 2018-05-10 ENCOUNTER — EMERGENCY (EMERGENCY)
Facility: HOSPITAL | Age: 38
LOS: 1 days | Discharge: HOME | End: 2018-05-10
Admitting: EMERGENCY MEDICINE
Payer: COMMERCIAL

## 2018-05-10 VITALS
OXYGEN SATURATION: 98 % | HEART RATE: 98 BPM | TEMPERATURE: 100 F | DIASTOLIC BLOOD PRESSURE: 65 MMHG | WEIGHT: 134.92 LBS | SYSTOLIC BLOOD PRESSURE: 115 MMHG | RESPIRATION RATE: 18 BRPM

## 2018-05-10 DIAGNOSIS — J06.9 ACUTE UPPER RESPIRATORY INFECTION, UNSPECIFIED: ICD-10-CM

## 2018-05-10 DIAGNOSIS — R09.81 NASAL CONGESTION: ICD-10-CM

## 2018-05-10 DIAGNOSIS — Z98.891 HISTORY OF UTERINE SCAR FROM PREVIOUS SURGERY: Chronic | ICD-10-CM

## 2018-05-10 DIAGNOSIS — Z79.899 OTHER LONG TERM (CURRENT) DRUG THERAPY: ICD-10-CM

## 2018-05-10 PROCEDURE — 99284 EMERGENCY DEPT VISIT MOD MDM: CPT

## 2018-05-10 PROCEDURE — 99283 EMERGENCY DEPT VISIT LOW MDM: CPT | Mod: 25

## 2018-05-10 PROCEDURE — 94640 AIRWAY INHALATION TREATMENT: CPT

## 2018-05-10 RX ORDER — IPRATROPIUM/ALBUTEROL SULFATE 18-103MCG
3 AEROSOL WITH ADAPTER (GRAM) INHALATION ONCE
Qty: 0 | Refills: 0 | Status: COMPLETED | OUTPATIENT
Start: 2018-05-10 | End: 2018-05-10

## 2018-05-10 RX ORDER — PSEUDOEPHEDRINE HCL 30 MG
30 TABLET ORAL ONCE
Qty: 0 | Refills: 0 | Status: COMPLETED | OUTPATIENT
Start: 2018-05-10 | End: 2018-05-10

## 2018-05-10 RX ORDER — IBUPROFEN 200 MG
800 TABLET ORAL ONCE
Qty: 0 | Refills: 0 | Status: COMPLETED | OUTPATIENT
Start: 2018-05-10 | End: 2018-05-10

## 2018-05-10 RX ADMIN — Medication 800 MILLIGRAM(S): at 17:31

## 2018-05-10 RX ADMIN — Medication 3 MILLILITER(S): at 17:32

## 2018-05-10 RX ADMIN — Medication 30 MILLIGRAM(S): at 17:32

## 2018-05-10 NOTE — ED PROVIDER NOTE - MEDICAL DECISION MAKING DETAILS
37 yo female with nasal congestion postnasal drip and cough . afebrile, non-toxic appearing. possibilities of  seasonal allergy vs viral infection discussed. plan : supportive care, d/c home for out pt f/u

## 2018-05-10 NOTE — ED PROVIDER NOTE - ENMT, MLM
Airway patent, Nasal mucosa clear. pharyngeal erythema+, postnasal drip+, nasal congestion and tenderness over the fromtal sinuses,  Throat has no vesicles, no oropharyngeal exudates and uvula is midline.

## 2018-05-10 NOTE — ED PROVIDER NOTE - OBJECTIVE STATEMENT
39 yo female c/o nasal congestion x 1 week, c/o sore throat, sinus pressure headache and nasal congestion for the past 3 days. Pt denies fever or chills, denies SOB or chest pain, c/o mild cough.

## 2019-06-09 ENCOUNTER — EMERGENCY (EMERGENCY)
Facility: HOSPITAL | Age: 39
LOS: 1 days | Discharge: HOME | End: 2019-06-09
Admitting: EMERGENCY MEDICINE
Payer: COMMERCIAL

## 2019-06-09 VITALS
RESPIRATION RATE: 16 BRPM | SYSTOLIC BLOOD PRESSURE: 134 MMHG | OXYGEN SATURATION: 97 % | DIASTOLIC BLOOD PRESSURE: 84 MMHG | TEMPERATURE: 98 F | HEART RATE: 88 BPM

## 2019-06-09 DIAGNOSIS — Z98.891 HISTORY OF UTERINE SCAR FROM PREVIOUS SURGERY: Chronic | ICD-10-CM

## 2019-06-09 PROCEDURE — 99282 EMERGENCY DEPT VISIT SF MDM: CPT

## 2019-06-09 NOTE — ED PROVIDER NOTE - NSFOLLOWUPINSTRUCTIONS_ED_ALL_ED_FT
Follow up with your primary doctor, OBGYN and see an allergist. If you need an allergist call  or refer to the attached sheet. Take benadyl at night (25-50mg) and take zyrtec during the day if needed for rash. Return to the ER for throat swelling, difficulty breathing or any other changes that concern you.  Advance activity as tolerated.  Continue all previously prescribed medications as directed.  Follow up with your primary care physician in 48-72 hours- bring copies of your results.  Return to the ER for worsening or persistent symptoms, and/or ANY NEW OR CONCERNING SYMPTOMS. If you have issues obtaining follow up, please call: [phone] to obtain a doctor or specialist who takes your insurance in your area.  You may call [phone] to make an appointment with the internal medicine clinic.

## 2019-06-09 NOTE — ED ADULT TRIAGE NOTE - CHIEF COMPLAINT QUOTE
pt is 12 wks preg c/o pruretic, raised rash intermittenly x2 wks. ob/gyn advised pt to take benadryl, last took last night

## 2019-06-09 NOTE — ED PROVIDER NOTE - OBJECTIVE STATEMENT
40 Y/O F currently 12 weeks pregnant presents with rash. Pt states she has a rash on her arms and overall body worse when she is outside. States her eyes also become watery with the rash. Denies new soaps creams detergents or medications. States she took benadryl overnight as recommended by her OBGYN with some relief. She denies any ABD PN N V D DIzz fever chills nightsweats or any other symptoms or complaints. Denies throat or facial swelling or difficulty swallowing.

## 2019-06-09 NOTE — ED PROVIDER NOTE - CLINICAL SUMMARY MEDICAL DECISION MAKING FREE TEXT BOX
Pt presents with erythematous rash and watery eyes. Rash is maculopapular consistent with allergy. Pt denies difficulty swallowing, throat closing or any other symptoms or complaints. Pt advised to take benadryl QHS and take zyrtec in AM PRN for allergy. Return to ED for severe symptoms, allergy referral given.

## 2021-12-28 NOTE — ED PROVIDER NOTE - TEMPLATE, MLM
Abdominal Pain, N/V/D Birth Control Pills Counseling: Birth Control Pill Counseling: I discussed with the patient the potential side effects of OCPs including but not limited to increased risk of stroke, heart attack, thrombophlebitis, deep venous thrombosis, hepatic adenomas, breast changes, GI upset, headaches, and depression.  The patient verbalized understanding of the proper use and possible adverse effects of OCPs. All of the patient's questions and concerns were addressed.

## 2023-05-17 NOTE — ED PROVIDER NOTE - CONSTITUTIONAL, MLM
moist normal... Well appearing, well nourished, awake, alert, oriented to person, place, time/situation and in no apparent distress.

## 2024-11-11 NOTE — ED CDU PROVIDER SUBSEQUENT DAY NOTE - SKIN [+], MLM
Assessment per [facility] guidelines  Non labored breathing, skin dry warm and appropriate for race.Abdomen soft     RASH